# Patient Record
Sex: MALE | Race: WHITE
[De-identification: names, ages, dates, MRNs, and addresses within clinical notes are randomized per-mention and may not be internally consistent; named-entity substitution may affect disease eponyms.]

---

## 2018-03-08 ENCOUNTER — HOSPITAL ENCOUNTER (EMERGENCY)
Dept: HOSPITAL 17 - NEPD | Age: 38
Discharge: HOME | End: 2018-03-08
Payer: COMMERCIAL

## 2018-03-08 VITALS — HEIGHT: 71 IN | BODY MASS INDEX: 27.78 KG/M2 | WEIGHT: 198.42 LBS

## 2018-03-08 VITALS — TEMPERATURE: 97.9 F | SYSTOLIC BLOOD PRESSURE: 124 MMHG | DIASTOLIC BLOOD PRESSURE: 81 MMHG

## 2018-03-08 VITALS
OXYGEN SATURATION: 98 % | RESPIRATION RATE: 16 BRPM | TEMPERATURE: 98.9 F | SYSTOLIC BLOOD PRESSURE: 133 MMHG | DIASTOLIC BLOOD PRESSURE: 63 MMHG | HEART RATE: 82 BPM

## 2018-03-08 DIAGNOSIS — F41.9: ICD-10-CM

## 2018-03-08 DIAGNOSIS — F20.0: Primary | ICD-10-CM

## 2018-03-08 DIAGNOSIS — F17.200: ICD-10-CM

## 2018-03-08 PROCEDURE — 99283 EMERGENCY DEPT VISIT LOW MDM: CPT

## 2018-03-08 NOTE — PD
HPI


Chief Complaint:  Psychiatric Symptoms


Time Seen by Provider:  17:35


Travel History


International Travel<30 days:  No


Contact w/Intl Traveler<30days:  No


Traveled to known affect area:  No





History of Present Illness


HPI


37-year-old male here with reports of paranoia, requesting psychiatric 

evaluation.  Patient denies suicidal or homicidal ideation.  Patient states he 

has had a lot of stress in his life in the last couple of months regarding his 

rental situation next to his dad.  He states he has been more paranoid than 

normal, and he was concerned about this.  Patient states he got an injection of 

his [place] today, and since being here in the emergency department he actually 

has felt improved.  Patient also has Vistaril that he takes for anxiety which 

he has in his car.  Patient spoke with me for several minutes, and then 

requested to see if he was able to leave.  He denies any medical issues 

currently. He has no pain, fever, chills, or other symptoms.  Patient has no 

known drug allergies.





PFSH


Past Medical History


Anxiety:  Yes


Diabetes:  No


Diminished Hearing:  No


Psychiatric:  Yes (Paranoid Schizophrenia)


Schizophrenia:  Yes (PARANOID)





Social History


Alcohol Use:  No (RARE)


Tobacco Use:  Yes (1 PPD)


Substance Use:  No





Allergies-Medications


(Allergen,Severity, Reaction):  


Coded Allergies:  


     No Known Allergies (Verified  Adverse Reaction, Unknown, 3/8/18)


Reported Meds & Prescriptions





Reported Meds & Active Scripts


Active








Review of Systems


Except as stated in HPI:  all other systems reviewed are Neg


General / Constitutional:  No: Fever


Eyes:  No: Visual changes


HENT:  No: Headaches


Cardiovascular:  No: Chest Pain or Discomfort


Respiratory:  No: Shortness of Breath


Gastrointestinal:  No: Abdominal Pain


Genitourinary:  No: Dysuria


Musculoskeletal:  No: Pain


Skin:  No Rash


Neurologic:  No: Weakness


Psychiatric:  No: Depression


Endocrine:  No: Polydipsia


Hematologic/Lymphatic:  No: Easy Bruising





Physical Exam


Narrative


GENERAL: Patient is in no acute distress.


SKIN: Warm and dry.  Normal color.  Normal turgor.


HEAD: Atraumatic. Normocephalic. 


EYES: Pupils equal and round. No scleral icterus. No injection or drainage. 


ENT: No nasal bleeding or discharge.  Mucous membranes pink and moist.  Pharynx 

is clear.  Airways patent.


NECK: Trachea midline.  Supple and nontender.


CARDIOVASCULAR: Regular rate and rhythm.  


RESPIRATORY: No accessory muscle use. Clear to auscultation. Breath sounds 

equal bilaterally. 


MUSCULOSKELETAL: Extremities without clubbing, cyanosis, or edema. No obvious 

deformities. 


NEUROLOGICAL: Awake and alert. No obvious cranial nerve deficits.  Motor 

grossly within normal limits. Five out of 5 muscle strength in the arms and 

legs.  Normal speech.


PSYCHIATRIC: Appropriate mood and affect; insight and judgment normal.  Patient 

is not suicidal or homicidal.  Patient contracts for safety.





Data


Data


Last Documented VS





Vital Signs








  Date Time  Temp Pulse Resp B/P (MAP) Pulse Ox O2 Delivery O2 Flow Rate FiO2


 


3/8/18 16:10 98.9 82 16 133/63 (86) 98   








Orders





 Orders


Complete Blood Count With Diff (3/8/18 17:35)


Comprehensive Metabolic Panel (3/8/18 17:35)


Thyroid Stimulating Hormone (3/8/18 17:35)


Drug Screen, Random Urine (3/8/18 17:35)


Alcohol (Ethanol) (3/8/18 17:35)








MDM


Medical Decision Making


Medical Screen Exam Complete:  Yes


Emergency Medical Condition:  Yes


Medical Record Reviewed:  Yes


Differential Diagnosis


Paranoia.  History of schizophrenia.  Anxiety.


Narrative Course


Patient is evaluated and medically stable.


I have a discussion with the patient stating that he is welcome to stay and be 

medically cleared for psychiatric evaluation, but he is able to leave at any 

time if he chooses to do so.


I do not feel this patient is a hazard to himself or others.


I have no reason to Baker act this patient.


After me seeing the patient and discussing the alternatives with him, the 

patient chooses to leave to go home, and follow-up with his counselor tomorrow.


I feel this is safe for the patient to do at this time.


The patient is instructed to return if symptoms worsen as needed.


He contracts for safety and agrees with this plan.





Diagnosis





 Primary Impression:  


 Schizophrenia


 Qualified Codes:  F20.0 - Paranoid schizophrenia


Patient Instructions:  General Instructions





***Additional Instructions:  


Patient is evaluated and medically stable.


I have a discussion with the patient stating that he is welcome to stay and be 

medically cleared for psychiatric evaluation, but he is able to leave at any 

time if he chooses to do so.


I do not feel this patient is a hazard to himself or others.


I have no reason to Baker act this patient.


After me seeing the patient and discussing the alternatives with him, the 

patient chooses to leave to go home, and follow-up with his counselor tomorrow.


I feel this is safe for the patient to do at this time.


The patient is instructed to return if symptoms worsen as needed.


He contracts for safety and agrees with this plan.


***Med/Other Pt SpecificInfo:  No Change to Meds


Disposition:  01 DISCHARGE HOME


Condition:  Stable











Matt Godinez Mar 8, 2018 17:36

## 2018-04-01 ENCOUNTER — HOSPITAL ENCOUNTER (EMERGENCY)
Dept: HOSPITAL 17 - NEPD | Age: 38
LOS: 1 days | Discharge: HOME | End: 2018-04-02
Payer: MEDICAID

## 2018-04-01 VITALS — HEIGHT: 71 IN | WEIGHT: 194.01 LBS | BODY MASS INDEX: 27.16 KG/M2

## 2018-04-01 VITALS
TEMPERATURE: 98.5 F | OXYGEN SATURATION: 99 % | DIASTOLIC BLOOD PRESSURE: 63 MMHG | RESPIRATION RATE: 18 BRPM | HEART RATE: 69 BPM | SYSTOLIC BLOOD PRESSURE: 131 MMHG

## 2018-04-01 DIAGNOSIS — F17.200: ICD-10-CM

## 2018-04-01 DIAGNOSIS — F20.0: ICD-10-CM

## 2018-04-01 DIAGNOSIS — F41.9: Primary | ICD-10-CM

## 2018-04-01 PROCEDURE — 99283 EMERGENCY DEPT VISIT LOW MDM: CPT
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
## 2018-04-02 NOTE — PD
Physical Exam


Date Seen by Provider:  Apr 2, 2018


Time Seen by Provider:  02:42


Narrative


For full history and physical examination please see previous providers note.





Data


Data


Last Documented VS





Vital Signs








  Date Time  Temp Pulse Resp B/P (MAP) Pulse Ox O2 Delivery O2 Flow Rate FiO2


 


4/1/18 22:01 98.5 69 18 131/63 (85) 99   








Orders





 Orders


Psych Screen (4/1/18 23:23)








[facility]


Medical Record Reviewed:  Yes


Supervised Visit with WHIT:  No


Narrative Course


Patient presented voluntarily for psychiatric evaluation.  Patient was seen and 

evaluated by the psychiatric nurse screener, patient has had no suicidal 

homicidal ideations.  For full H&P please see previous providers note.  Patient 

is of no harm to himself or anyone else, he is compliant with psychiatric 

medications and counseling.  He lives within close proximity to family members 

and feels safe at home per his report to me.  Patient will be discharged home, 

is encouraged to return to emergency department immediately for any new or 

worsening symptoms.  He was advised to continue to follow-up with his 

psychiatrist as scheduled.  He verbalized understanding of these instructions.  

Patient stable for discharge.


Diagnosis





 Primary Impression:  


 Anxiety


 Additional Impression:  


 Schizophrenia


 Qualified Codes:  F20.9 - Schizophrenia, unspecified


Referrals:  


Psychiatrist


Patient Instructions:  General Instructions





***Additional Instruction:  


Return to emergency department immediately for any new or worsening symptoms


Continue home medications as previously prescribed


Follow-up with your primary doctor


Follow-up with your psychiatrist as scheduled


***Med/Other Pt SpecificInfo:  No Change to Meds


Disposition:  01 DISCHARGE HOME


Condition:  Stable











Karyn Chance Apr 2, 2018 02:45

## 2018-04-06 ENCOUNTER — HOSPITAL ENCOUNTER (INPATIENT)
Dept: HOSPITAL 17 - NEPJ | Age: 38
LOS: 4 days | Discharge: HOME | DRG: 885 | End: 2018-04-10
Attending: PSYCHIATRY & NEUROLOGY | Admitting: PSYCHIATRY & NEUROLOGY
Payer: COMMERCIAL

## 2018-04-06 VITALS
HEART RATE: 83 BPM | RESPIRATION RATE: 18 BRPM | SYSTOLIC BLOOD PRESSURE: 121 MMHG | OXYGEN SATURATION: 97 % | DIASTOLIC BLOOD PRESSURE: 70 MMHG | TEMPERATURE: 99.3 F

## 2018-04-06 VITALS
HEART RATE: 93 BPM | SYSTOLIC BLOOD PRESSURE: 130 MMHG | RESPIRATION RATE: 17 BRPM | DIASTOLIC BLOOD PRESSURE: 66 MMHG | TEMPERATURE: 98.6 F | OXYGEN SATURATION: 98 %

## 2018-04-06 VITALS — BODY MASS INDEX: 25.93 KG/M2 | HEIGHT: 71 IN | WEIGHT: 185.19 LBS

## 2018-04-06 DIAGNOSIS — F20.0: Primary | ICD-10-CM

## 2018-04-06 DIAGNOSIS — Z72.0: ICD-10-CM

## 2018-04-06 LAB
ALBUMIN SERPL-MCNC: 3.8 GM/DL (ref 3.4–5)
ALP SERPL-CCNC: 70 U/L (ref 45–117)
ALT SERPL-CCNC: 18 U/L (ref 12–78)
APAP SERPL-MCNC: (no result) MCG/ML (ref 10–30)
AST SERPL-CCNC: 14 U/L (ref 15–37)
BASOPHILS # BLD AUTO: 0.1 TH/MM3 (ref 0–0.2)
BASOPHILS NFR BLD: 1 % (ref 0–2)
BILIRUB SERPL-MCNC: 0.3 MG/DL (ref 0.2–1)
BUN SERPL-MCNC: 8 MG/DL (ref 7–18)
CALCIUM SERPL-MCNC: 8.5 MG/DL (ref 8.5–10.1)
CHLORIDE SERPL-SCNC: 106 MEQ/L (ref 98–107)
CREAT SERPL-MCNC: 0.94 MG/DL (ref 0.6–1.3)
EOSINOPHIL # BLD: 0.4 TH/MM3 (ref 0–0.4)
EOSINOPHIL NFR BLD: 3.5 % (ref 0–4)
ERYTHROCYTE [DISTWIDTH] IN BLOOD BY AUTOMATED COUNT: 12.7 % (ref 11.6–17.2)
GFR SERPLBLD BASED ON 1.73 SQ M-ARVRAT: 91 ML/MIN (ref 89–?)
GLUCOSE SERPL-MCNC: 86 MG/DL (ref 74–106)
HCO3 BLD-SCNC: 25.5 MEQ/L (ref 21–32)
HCT VFR BLD CALC: 42.2 % (ref 39–51)
HGB BLD-MCNC: 14.6 GM/DL (ref 13–17)
LYMPHOCYTES # BLD AUTO: 2.2 TH/MM3 (ref 1–4.8)
LYMPHOCYTES NFR BLD AUTO: 20.2 % (ref 9–44)
MCH RBC QN AUTO: 32 PG (ref 27–34)
MCHC RBC AUTO-ENTMCNC: 34.5 % (ref 32–36)
MCV RBC AUTO: 92.5 FL (ref 80–100)
MONOCYTE #: 0.8 TH/MM3 (ref 0–0.9)
MONOCYTES NFR BLD: 7.1 % (ref 0–8)
NEUTROPHILS # BLD AUTO: 7.3 TH/MM3 (ref 1.8–7.7)
NEUTROPHILS NFR BLD AUTO: 68.2 % (ref 16–70)
PLATELET # BLD: 190 TH/MM3 (ref 150–450)
PMV BLD AUTO: 9.7 FL (ref 7–11)
PROT SERPL-MCNC: 6.8 GM/DL (ref 6.4–8.2)
RBC # BLD AUTO: 4.56 MIL/MM3 (ref 4.5–5.9)
SODIUM SERPL-SCNC: 138 MEQ/L (ref 136–145)
WBC # BLD AUTO: 10.7 TH/MM3 (ref 4–11)

## 2018-04-06 PROCEDURE — 80307 DRUG TEST PRSMV CHEM ANLYZR: CPT

## 2018-04-06 PROCEDURE — 80048 BASIC METABOLIC PNL TOTAL CA: CPT

## 2018-04-06 PROCEDURE — 93005 ELECTROCARDIOGRAM TRACING: CPT

## 2018-04-06 PROCEDURE — 80053 COMPREHEN METABOLIC PANEL: CPT

## 2018-04-06 PROCEDURE — 80061 LIPID PANEL: CPT

## 2018-04-06 PROCEDURE — 83036 HEMOGLOBIN GLYCOSYLATED A1C: CPT

## 2018-04-06 PROCEDURE — 80164 ASSAY DIPROPYLACETIC ACD TOT: CPT

## 2018-04-06 PROCEDURE — 85025 COMPLETE CBC W/AUTO DIFF WBC: CPT

## 2018-04-06 PROCEDURE — 84443 ASSAY THYROID STIM HORMONE: CPT

## 2018-04-06 PROCEDURE — 99285 EMERGENCY DEPT VISIT HI MDM: CPT

## 2018-04-06 NOTE — PD
HPI


Chief Complaint:  Psychiatric Symptoms


Time Seen by Provider:  17:24


Travel History


International Travel<30 days:  No


Contact w/Intl Traveler<30days:  No


Traveled to known affect area:  No





History of Present Illness


HPI


37-year-old male that presents to the ED for evaluation of EXPARTE.  Patient 

has a history of schizophrenia and was brought here as patient apparently has 

been very delusional and noncompliant.  Patient was actually seen here recently 

voluntarily for evaluation of this and was discharged.  Per expert today he 

continues to be delusional and somewhat aggressive.  Per patient himself he has 

been compliant with his medications and yesterday got his shot of medication.  

Per patient he follows with his psychiatrist.  He has no other medical issues.  

Denies seeing things during other.  Denies any suicidal or homicidal ideation.  

He was brought here by police for evaluation of the EXPARTE.  Unclear as to the 

length of symptoms that this started.  Denies any drugs but states drinking 

alcohol occasionally.





PFSH


Past Medical History


Anxiety:  Yes


Diabetes:  No


Diminished Hearing:  No


Psychiatric:  Yes (Paranoid Schizophrenia)


Schizophrenia:  Yes (PARANOID)





Social History


Alcohol Use:  Yes (RARE)


Tobacco Use:  Yes (1 PPD)


Substance Use:  No (pt denies)





Allergies-Medications


(Allergen,Severity, Reaction):  


Coded Allergies:  


     No Known Allergies (Verified  Adverse Reaction, Unknown, 4/6/18)


Reported Meds & Prescriptions





Reported Meds & Active Scripts


Active


Reported


Vistaril (Hydroxyzine Pamoate) 50 Mg Cap 50 Mg PO BID








Review of Systems


Except as stated in HPI:  all other systems reviewed are Neg





Physical Exam


Narrative


GENERAL: 


SKIN: Warm and dry.


HEAD: Atraumatic. Normocephalic. 


EYES: Pupils equal and round. No scleral icterus. No injection or drainage. 


ENT: No nasal bleeding or discharge.  Mucous membranes pink and moist.  Tongue 

is midline.  No uvula deviation.


NECK: Trachea midline. No JVD. 


CARDIOVASCULAR: Regular rate and rhythm.  


RESPIRATORY: No accessory muscle use. Clear to auscultation. Breath sounds 

equal bilaterally. 


GASTROINTESTINAL: Abdomen soft, non-tender, nondistended. Hepatic and splenic 

margins not palpable. 


MUSCULOSKELETAL: Extremities without clubbing, cyanosis, or edema. No obvious 

deformities.  Full range of motion of the upper and lower extremities 

bilaterally.  2+ pulses bilaterally.


NEUROLOGICAL: Awake and alert. No obvious cranial nerve deficits.  Motor 

grossly within normal limits. Five out of 5 muscle strength in the arms and 

legs.  Normal speech.


PSYCHIATRIC: Appropriate mood and affect; insight and judgment normal.





Data


Data


Last Documented VS





Vital Signs








  Date Time  Temp Pulse Resp B/P (MAP) Pulse Ox O2 Delivery O2 Flow Rate FiO2


 


4/6/18 17:19 98.6 93 17 130/66 (87) 98   








Orders





 Orders


Complete Blood Count With Diff (4/6/18 17:18)


Comprehensive Metabolic Panel (4/6/18 17:18)


Thyroid Stimulating Hormone (4/6/18 17:18)


Psych Screen (4/6/18 17:18)


Drug Screen, Random Urine (4/6/18 17:18)


Alcohol (Ethanol) (4/6/18 17:18)


Salicylates (Aspirin) (4/6/18 17:18)


Tylenol (Acetaminophen) (4/6/18 17:18)





Labs





Laboratory Tests








Test


  4/6/18


17:50


 


White Blood Count 10.7 TH/MM3 


 


Red Blood Count 4.56 MIL/MM3 


 


Hemoglobin 14.6 GM/DL 


 


Hematocrit 42.2 % 


 


Mean Corpuscular Volume 92.5 FL 


 


Mean Corpuscular Hemoglobin 32.0 PG 


 


Mean Corpuscular Hemoglobin


Concent 34.5 % 


 


 


Red Cell Distribution Width 12.7 % 


 


Platelet Count 190 TH/MM3 


 


Mean Platelet Volume 9.7 FL 


 


Neutrophils (%) (Auto) 68.2 % 


 


Lymphocytes (%) (Auto) 20.2 % 


 


Monocytes (%) (Auto) 7.1 % 


 


Eosinophils (%) (Auto) 3.5 % 


 


Basophils (%) (Auto) 1.0 % 


 


Neutrophils # (Auto) 7.3 TH/MM3 


 


Lymphocytes # (Auto) 2.2 TH/MM3 


 


Monocytes # (Auto) 0.8 TH/MM3 


 


Eosinophils # (Auto) 0.4 TH/MM3 


 


Basophils # (Auto) 0.1 TH/MM3 


 


CBC Comment DIFF FINAL 


 


Differential Comment  











MDM


Medical Decision Making


Medical Screen Exam Complete:  Yes


Emergency Medical Condition:  Yes


Medical Record Reviewed:  Yes


Interpretation(s)


CBC & BMP Diagram


4/6/18 17:50








Differential Diagnosis


Depression versus suicidal ideation versus anxiety versus adjustment disorder 

versus mood disorder versus bipolar disorder versus schizophrenia versus 

paranoid disorder versus psychosis versus substance abuse versus alcohol abuse 

versus alcohol induced psychosis versus homicidality addition versus cutting 

versus personality disorder


Narrative Course


37-year-old male that presents to the ED for evaluation of psych.  Patient was 

properly examined and was found to have signs and symptoms consistent with what 

appears to be psychiatric illness.  Labs were drawn.  Patient was medically 

clear.  Okay to be seen by psych.


Mental health screening was discussed with the patient.





Diagnosis





 Primary Impression:  


 Schizophrenia


 Qualified Codes:  F20.9 - Schizophrenia, unspecified











Cristobal Gamez Apr 6, 2018 18:17

## 2018-04-07 VITALS
TEMPERATURE: 99.2 F | RESPIRATION RATE: 18 BRPM | SYSTOLIC BLOOD PRESSURE: 114 MMHG | HEART RATE: 77 BPM | DIASTOLIC BLOOD PRESSURE: 71 MMHG | OXYGEN SATURATION: 99 %

## 2018-04-07 VITALS
DIASTOLIC BLOOD PRESSURE: 73 MMHG | RESPIRATION RATE: 18 BRPM | OXYGEN SATURATION: 96 % | SYSTOLIC BLOOD PRESSURE: 114 MMHG | TEMPERATURE: 98 F | HEART RATE: 90 BPM

## 2018-04-07 VITALS
OXYGEN SATURATION: 96 % | TEMPERATURE: 97.4 F | HEART RATE: 87 BPM | RESPIRATION RATE: 19 BRPM | SYSTOLIC BLOOD PRESSURE: 126 MMHG | DIASTOLIC BLOOD PRESSURE: 76 MMHG

## 2018-04-07 RX ADMIN — NICOTINE SCH PATCH: 21 PATCH, EXTENDED RELEASE TOPICAL at 15:23

## 2018-04-07 RX ADMIN — DIVALPROEX SODIUM SCH MG: 500 TABLET, DELAYED RELEASE ORAL at 15:21

## 2018-04-07 RX ADMIN — DIVALPROEX SODIUM SCH MG: 500 TABLET, DELAYED RELEASE ORAL at 21:28

## 2018-04-07 NOTE — HHI.HP
Provisional Diagnosis


Admission Date





[place].


Schizophrenia





                               Certification of Person's Competence 


                           To Provide Express and Informed Consent





I have personally examined Lior Francis , a person being served at 

[facility] on, Apr 7, 2018 14:06.


Express and informed consent means consent voluntarily given in writing, by a 

competent person, after sufficient explanation and disclosure of the subject 

matter involved to enable the person to make a knowing and willful decision 

without any element of force, fraud, deceit, duress, or other form of 

constraint or coercion.





This person is 18 years of age or older, is not now known to be incompetent to 

consent to treatment with a guardian advocate, and does not have a health care 

surrogate or proxy currently making medical treatment decisions.  I have found 

this person to be one of the following:





[] Competent to provide express and informed consent, as defined above, for 

voluntary admission to this facility and is competent to provide express and 

informed consent for treatment.  He/she has the consistent capacity to make 

well reasoned, willful, and knowing decisions concerning his or her medical or 

mental health treatment.  The person fully and consistently understands the 

purpose of the admission for examination/placement and is fully capable of 

personally exercising all rights [place]





[xxx] Incompetent to provide express and informed consent to voluntary admission

, and this is incompetent to provide express and informed consent to treatment.

  The person must be transferred to involuntary status and a petition for a 

guardian advocate filed with the Circuit Court.





[] Refusing to provide express and informed consent to voluntary admission but 

is competent to provide express and informed consent for treatment.  The person 

must be discharged or transferred to involuntary status.





Form shall be completed within 24 hours of a person's arrival at the receiving 

facility and filed in the clinical record of each person:


1. Admitted on a voluntary basis


2. Permitted to provide express and informed consent to his/her own treatment


3. Allowed to transfer from involuntary to voluntary status


4. Prior to permitting a person to consent to his or her own treatment after 

having been previously found incompetent to consent to treatment.





History of Present Illness


Capacity:  Has Capacity


HPI


Patient is a 36 y/o  man, single, no children, lives alone, on SSI, 

has part time employ, psychiatric history of schizophrenia, multiple 

psychiatric admissions, no prior suicide attempt or self-injurious behavior, 

history of state hospitalizations twice, remote history of substance use, who 

was brought in under ex parte which reported patient with recent aggressive 

behavior toward father as well as displaying aggressive behavior, e.g. pushing 

refrigerator out of second story window, was brought in for evaluation and 

admitted to the inpatient psychiatry for further evaluation and management.  

Patient was found sitting in hospital bed noted B, cooperative.  Patient states 

that he did not understand why he was brought to the hospital he states has 

been compliant with outpatient treatment which she reports having received 

Invega Sustenna yesterday (dose unspecified) and being followed by [facility].  Patient mentions that the report of aggressive behavior was "months 

ago" and denies having any recent episodes of agitation or aggression.  Patient 

states that he just wants to be happy and live his life without any difficulty.

  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 

own transportation and recently had come to the ER requesting assistance with 

placement as he mentions is considering living elsewhere as keep states his 

family does not understand what he goes through.  Patient denies any depressive

, manic or psychotic symptoms at this time.  Patient reports having had 

previous auditory hallucinations years ago when he was using ecstasy but denies 

any recent perceptual disturbances, no delusional material elicited during 

interview.  Patient this time denies any SI, HI, AVH or delusions.


Family psychiatric history: Denies


Past psychiatric history: Previous psychiatric diagnoses schizophrenia, 

multiple psychiatric admissions, no prior suicide attempt or self-injurious 

behavior, reports having been involved with [facility], and state 

hospitalizations on 2 occasions.  Patient has outpatient provider at [facility] and reports having been receiving several months of Invega Sustenna 

and plan to start Invega Trinza, also Vistaril 50 mg p.o. twice daily.  Denies 

history of abuse.


Substance use history: Tobacco one half packs per day, alcohol use "sometimes", 

2-3 times per week usually a couple of beers at a time, denies any recent drug 

use but did report remote history of drug use as a teenager.  Denies any 

previous rehabilitation programs for substance use.


Past medical history: Denies


Allergies: NKDA


Social history: Single, no children, domiciled alone, but father lives next door

, unemployed on SSI but also states having a part-time job, highest education 

is ninth grade.  Collateral contacts include father, Norman Francis and sister 

Niurka Francis - [phone].





Review of Systems


Respiratory:  COMPLAINS OF: Shortness of breath





Past Psych History


Psychological trauma history


Denies


Violence risk - others (6 mos)


Elevated due to recent report of aggressive behavior toward others


Violence risk - self (6 mos)


Low





Substance Abuse History


Drugs/Alcohol past 12 months


Tobacco one half packs per day, alcohol use "sometimes", 2-3 times per week 

usually a couple of beers at a time, denies any recent drug use but did report 

remote history of drug use as a teenager.  Denies any previous rehabilitation 

programs for substance use.





Past Family Social History


Coded Allergies:  


     No Known Allergies (Verified  Adverse Reaction, Unknown, 4/6/18)


Reported Medications


Paliperidone Palmitate Inj (Invega Sustenna Inj) 156 Mg/Ml Inj, 156 MG IM Q28D 

for Schizophrenia, #1 VIAL 0 Refills


   4/7/18


Hydroxyzine Pamoate (Vistaril) 50 Mg Cap, 50 MG PO BID, CAP 0 Refills


   3/8/18





Current Medications








 Medications


  (Trade)  Dose


 Ordered  Sig/Carolee


 Route  Start Time


 Stop Time Status Last Admin


 


  (Vistaril)  50 mg  BID


 PO  4/7/18 14:15


   UNV  


 


 


  (Tylenol)  650 mg  Q4H  PRN


 PO  4/7/18 14:15


   UNV  


 


 


  (Milk Of


 Magnesia Liq)  30 ml  DAILY  PRN


 PO  4/7/18 14:15


   UNV  


 


 


  (Mag-Al Plus


 Susp Liq)  30 ml  Q6H  PRN


 PO  4/7/18 14:15


   UNV  


 


 


  (Habitrol 21 Mg


 Patch.24 Hr)  1 patch  DAILY


 T-DERMAL  4/7/18 14:15


   UNV  


 


 


  (Atarax)  50 mg  Q6H  PRN


 PO  4/7/18 14:15


   UNV  


 


 


 Miscellaneous


 Information  1  DAILY


 T-DERMAL  4/8/18 09:00


   UNV  


 








Family Psych History


Denies


Social History


Single, no children, domiciled alone, but father lives next door, unemployed on 

SSI but also states having a part-time job, highest education is ninth grade.  

Collateral contacts include father, Norman Francis and sister iNurka Francis - [phone].


Patient's Strengths (min. 2)


Verbal and communicative





Physical Exam


Patient not noted to be acute distress, noted to have multiple tattoos, no 

gross motor abnormalities, no psychomotor agitation or retardation, no tremor 

signs of EPS.


Vital Signs





Vital Signs








  Date Time  Temp Pulse Resp B/P (MAP) Pulse Ox O2 Delivery O2 Flow Rate FiO2


 


4/7/18 05:00 98.0 90 18 114/73 (87) 96 Room Air  








Lab Results











Test


  4/6/18


17:50 4/6/18


18:40


 


White Blood Count 10.7 TH/MM3  


 


Red Blood Count 4.56 MIL/MM3  


 


Hemoglobin 14.6 GM/DL  


 


Hematocrit 42.2 %  


 


Mean Corpuscular Volume 92.5 FL  


 


Mean Corpuscular Hemoglobin 32.0 PG  


 


Mean Corpuscular Hemoglobin


Concent 34.5 % 


  


 


 


Red Cell Distribution Width 12.7 %  


 


Platelet Count 190 TH/MM3  


 


Mean Platelet Volume 9.7 FL  


 


Neutrophils (%) (Auto) 68.2 %  


 


Lymphocytes (%) (Auto) 20.2 %  


 


Monocytes (%) (Auto) 7.1 %  


 


Eosinophils (%) (Auto) 3.5 %  


 


Basophils (%) (Auto) 1.0 %  


 


Neutrophils # (Auto) 7.3 TH/MM3  


 


Lymphocytes # (Auto) 2.2 TH/MM3  


 


Monocytes # (Auto) 0.8 TH/MM3  


 


Eosinophils # (Auto) 0.4 TH/MM3  


 


Basophils # (Auto) 0.1 TH/MM3  


 


CBC Comment DIFF FINAL  


 


Differential Comment   


 


Blood Urea Nitrogen 8 MG/DL  


 


Creatinine 0.94 MG/DL  


 


Random Glucose 86 MG/DL  


 


Total Protein 6.8 GM/DL  


 


Albumin 3.8 GM/DL  


 


Calcium Level 8.5 MG/DL  


 


Alkaline Phosphatase 70 U/L  


 


Aspartate Amino Transf


(AST/SGOT) 14 U/L 


  


 


 


Alanine Aminotransferase


(ALT/SGPT) 18 U/L 


  


 


 


Total Bilirubin 0.3 MG/DL  


 


Sodium Level 138 MEQ/L  


 


Potassium Level 3.7 MEQ/L  


 


Chloride Level 106 MEQ/L  


 


Carbon Dioxide Level 25.5 MEQ/L  


 


Anion Gap 7 MEQ/L  


 


Estimat Glomerular Filtration


Rate 91 ML/MIN 


  


 


 


Thyroid Stimulating Hormone


3rd Gen 0.750 uIU/ML 


  


 


 


Salicylates Level 3.1 MG/DL  


 


Acetaminophen Level


  LESS THAN 2.0


MCG/ML 


 


 


Ethyl Alcohol Level


  LESS THAN 3


MG/DL 


 


 


Urine Opiates Screen  NEG 


 


Urine Barbiturates Screen  NEG 


 


Urine Amphetamines Screen  NEG 


 


Urine Benzodiazepines Screen  NEG 


 


Urine Cocaine Screen  NEG 


 


Urine Cannabinoids Screen  NEG 











Mental Status Examination


Appearance:  Appropriate


Consciousness:  Alert


Orientation:  Person, Place, Date/Time


Motor Activity:  Normal gait


Speech:  Unremarkable


Language:  Adequate


Fund of Knowledge:  Inadequate


Attention and Concentration:  Adequate


Memory:  Unremarkable


Mood:  Appropriate


Affect:  Blunt


Thought Process & Associations:  Intact, Logical, Linear


Thought Content:  Appropriate


Hallucination Type:  None


Delusion Type:  None


Suicidal Ideation:  No


Suicidal Plan:  No


Suicidal Intention:  No


Homicidal Ideation:  No


Homicidal Plan:  No


Homicidal Intention:  No


Insight:  Fair


Judgment:  Impulsive





Assessment & Plan


Problem List:  


(1) Schizophrenia


ICD Codes:  F20.9 - Schizophrenia


Status:  Acute


Assessment & Plan


Estimated LOS: 5-7 days.  Patient is a 37-year-old  man who carries a 

diagnosis schizophrenia, multiple psychiatric admissions, remote use of 

substance use as a teenager, brought in under ex parte due to report of recent 

aggressive behavior toward others which patient was admitted to the inpatient 

psychiatry unit for further evaluation and management.  We will start patient 

on valproic acid 500 mg p.o. twice daily for mood stabilization, hydroxyzine 50 

mg p.o. twice daily, will obtain verification of recent Invega Sustenna SCALES 

which she reports I received yesterday through [facility].  Collateral 

formation pending from family.  Social work intervention for psychosocial 

assessment.  Continue to monitor with behavior.  Discharge planning in progress.


Discharge Planning


To be determined.











Alex Hussein MD Apr 7, 2018 14:19

## 2018-04-08 VITALS
SYSTOLIC BLOOD PRESSURE: 115 MMHG | RESPIRATION RATE: 17 BRPM | TEMPERATURE: 99 F | DIASTOLIC BLOOD PRESSURE: 60 MMHG | OXYGEN SATURATION: 97 % | HEART RATE: 55 BPM

## 2018-04-08 VITALS
SYSTOLIC BLOOD PRESSURE: 125 MMHG | OXYGEN SATURATION: 96 % | HEART RATE: 84 BPM | RESPIRATION RATE: 18 BRPM | TEMPERATURE: 98.2 F | DIASTOLIC BLOOD PRESSURE: 72 MMHG

## 2018-04-08 LAB
BUN SERPL-MCNC: 11 MG/DL (ref 7–18)
CALCIUM SERPL-MCNC: 8.9 MG/DL (ref 8.5–10.1)
CHLORIDE SERPL-SCNC: 104 MEQ/L (ref 98–107)
CHOLEST SERPL-MCNC: 132 MG/DL (ref 120–200)
CHOLESTEROL/ HDL RATIO: 2.96 RATIO
CREAT SERPL-MCNC: 1.04 MG/DL (ref 0.6–1.3)
GFR SERPLBLD BASED ON 1.73 SQ M-ARVRAT: 81 ML/MIN (ref 89–?)
GLUCOSE SERPL-MCNC: 110 MG/DL (ref 74–106)
HBA1C MFR BLD: 4.7 % (ref 4.3–6)
HCO3 BLD-SCNC: 29.4 MEQ/L (ref 21–32)
HDLC SERPL-MCNC: 44.5 MG/DL (ref 40–60)
LDLC SERPL-MCNC: 76 MG/DL (ref 0–99)
SODIUM SERPL-SCNC: 141 MEQ/L (ref 136–145)
TRIGL SERPL-MCNC: 60 MG/DL (ref 42–150)

## 2018-04-08 RX ADMIN — NICOTINE SCH PATCH: 21 PATCH, EXTENDED RELEASE TOPICAL at 09:00

## 2018-04-08 RX ADMIN — DIVALPROEX SODIUM SCH MG: 500 TABLET, DELAYED RELEASE ORAL at 21:56

## 2018-04-08 RX ADMIN — DIVALPROEX SODIUM SCH MG: 500 TABLET, DELAYED RELEASE ORAL at 09:07

## 2018-04-08 NOTE — EKG
Date Performed: 04/08/2018       Time Performed: 12:32:53

 

PTAGE:      37 years

 

EKG:      Sinus rhythm 

 

 NORMAL ECG 

 

NO PREVIOUS TRACING            

 

DOCTOR:   Aaron Wu  Interpretating Date/Time  04/08/2018 20:03:13

## 2018-04-08 NOTE — HHI.PYPN
Subjective


Remarks


Reviewed electronic medical record and discussed case with staff.  Staff 

reports that patient has been pleasant and had a good night.  He has adapted 

well to the unit.  Follow-up was performed in the hallway with nurse present.  

Patient reports that he slept well and his appetite has been good.  He denies 

having thoughts of self-harm, homicidal ideation, visual or auditory 

hallucinations.  He does appear to be paranoid stating that "they are harassing 

me" when asked to elaborate on who they are he states "I do not know who they 

are".  He believes he is here due to an incident that occurred approximately a 

month ago.  He expressed a desire to go home.  His interactions with this 

provider were appropriate throughout the evaluation.





Mental Status Examination


Appearance:  Appropriate


Consciousness:  Alert


Orientation:  Person, Place, Date/Time


Motor Activity:  Normal gait


Speech:  Unremarkable


Language:  Adequate


Fund of Knowledge:  Inadequate


Attention and Concentration:  Adequate


Memory:  Unremarkable


Mood:  Appropriate


Affect:  Blunt


Thought Process & Associations:  Intact, Logical, Linear


Thought Content:  Appropriate


Hallucination Type:  None


Delusion Type:  None


Suicidal Ideation:  No


Suicidal Plan:  No


Suicidal Intention:  No


Homicidal Ideation:  No


Homicidal Plan:  No


Homicidal Intention:  No


Insight:  Fair


Judgment:  Impulsive





Results


Labs











Test


  4/8/18


08:09








Vitals/IOs





Vital Signs








  Date Time  Temp Pulse Resp B/P (MAP) Pulse Ox O2 Delivery O2 Flow Rate FiO2


 


4/8/18 06:37 99.0 55 17 115/60 (78) 97   


 


4/7/18 05:00      Room Air  











Assessment & Plan


Problem List:  


(1) Schizophrenia


ICD Codes:  F20.9 - Schizophrenia


Status:  Acute


Assessment & Plan


Estimated LOS: Patient remains paranoid and still claims the incident happened 

a month ago.  He requires psychiatric stabilization.


Justification for Cont. Inpt.


Moving this patient to a lower level of care would likely result in a 

decompensation at this point.  Patient is apparently a danger to others.











Ngoc Tan Apr 8, 2018 08:29

## 2018-04-09 VITALS
RESPIRATION RATE: 18 BRPM | OXYGEN SATURATION: 100 % | HEART RATE: 70 BPM | TEMPERATURE: 98.1 F | DIASTOLIC BLOOD PRESSURE: 61 MMHG | SYSTOLIC BLOOD PRESSURE: 121 MMHG

## 2018-04-09 VITALS
SYSTOLIC BLOOD PRESSURE: 117 MMHG | RESPIRATION RATE: 16 BRPM | TEMPERATURE: 98.2 F | DIASTOLIC BLOOD PRESSURE: 63 MMHG | OXYGEN SATURATION: 98 % | HEART RATE: 70 BPM

## 2018-04-09 RX ADMIN — DIVALPROEX SODIUM SCH MG: 500 TABLET, DELAYED RELEASE ORAL at 21:19

## 2018-04-09 RX ADMIN — NICOTINE SCH PATCH: 21 PATCH, EXTENDED RELEASE TOPICAL at 09:44

## 2018-04-09 RX ADMIN — DIVALPROEX SODIUM SCH MG: 500 TABLET, DELAYED RELEASE ORAL at 09:41

## 2018-04-09 NOTE — PD.PSY.CON
Provisional Diagnosis


Admission Date


Apr 7, 2018 at 14:04


[place] I.


Schizophrenia





History of Present Illness


Service


Psychiatry


Consult Requested By


Psychiatry


Reason for Consult


Second opinion


Primary Care Physician


Jamal Velazquez D.O.


HPI


Patient is a 36 y/o  man, single, no children, lives alone, on SSI, 

has part time employ, psychiatric history of schizophrenia, multiple 

psychiatric admissions, no prior suicide attempt or self-injurious behavior, 

history of state hospitalizations twice, remote history of substance use, who 

was brought in under ex parte which reported patient with recent aggressive 

behavior toward father as well as displaying aggressive behavior, e.g. pushing 

refrigerator out of second story window, was brought in for evaluation and 

admitted to the inpatient psychiatry for further evaluation and management.  

Patient was found sitting in hospital bed noted B, cooperative.  Patient states 

that he did not understand why he was brought to the hospital he states has 

been compliant with outpatient treatment which she reports having received 

Invega Sustenna yesterday (dose unspecified) and being followed by [facility].  Patient mentions that the report of aggressive behavior was "months 

ago" and denies having any recent episodes of agitation or aggression.  Patient 

states that he just wants to be happy and live his life without any difficulty.

  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 

own transportation and recently had come to the ER requesting assistance with 

placement as he mentions is considering living elsewhere as keep states his 

family does not understand what he goes through.  Patient denies any depressive

, manic or psychotic symptoms at this time.  Patient reports having had 

previous auditory hallucinations years ago when he was using ecstasy but denies 

any recent perceptual disturbances, no delusional material elicited during 

interview.  Patient this time denies any SI, HI, AVH or delusions.





The patient is a 37 years old  man, single, domiciled, part-time 

employed, on SSI, with extensive psychiatric history of schizophrenia, 

psychiatric admissions, no previous suicidal attempts, history of self 

injurious behavior, who was brought to the hospital due to aggressive behavior.

  Consulted to me for second opinion.  Psychiatric evaluation today the patient 

is cooperative, calm, reports feeling better, denies suicidal and homicidal 

ideation, denies visual and auditory hallucinations.  No aggressive behavior, 

no agitation has been reported.  The patient is logical, coherent and relevant.

  Oriented 3.  Compliant on medications.





Review of Systems


Constitutional:  DENIES: Diaphoretic episodes, Fatigue, Fever, Weight gain, 

Weight loss, Chills, Dizziness, Change in appetite, Night Sweats


Endocrine:  DENIES: Heat/cold intolerance, Polydipsia, Polyuria, Polyphagia


Eyes:  DENIES: Blurred vision, Diplopia, Eye inflammation, Eye pain, Vision loss

, Photosensitivity, Double Vision


Ears, nose, mouth, throat:  DENIES: Tinnitus, Hearing loss, Vertigo, Nasal 

discharge, Oral lesions, Throat pain, Hoarseness, Ear Pain, Running Nose, 

Epistaxis, Sinus Pain, Toothache, Odynophagia


Respiratory:  DENIES: Apneas, Cough, Snoring, Wheezing, Hemoptysis, Sputum 

production, Shortness of breath


Cardiovascular:  DENIES: Chest pain, Palpitations, Syncope, Dyspnea on Exertion

, PND, Lower Extremity Edema, Orthopnea, Claudication


Gastrointestinal:  DENIES: Abdominal pain, Black stools, Bloody stools, 

Constipation, Diarrhea, Nausea, Vomiting, Difficulty Swallowing, Anorexia


Genitourinary:  DENIES: Sexual dysfunction, Urinary frequency, Urinary 

incontinence, Urgency, Hematuria, Dysuria, Nocturia, Penile Discharge, 

Testicular Pain, Testicular Swelling


Musculoskeletal:  DENIES: Joint pain, Muscle aches, Stiffness, Joint Swelling, 

Back pain, Neck pain


Integumentary:  DENIES: Abnormal pigmentation, Nail changes, Pruritus, Rash


Hematologic/lymphatic:  DENIES: Bruising, Lymphadenopathy


Immunologic/allergic:  DENIES: Eczema, Urticaria


Neurologic:  DENIES: Abnormal gait, Headache, Localized weakness, Paresthesias, 

Seizures, Speech Problems, Tremor, Poor Balance


Psychiatric:  DENIES: Anxiety, Confusion, Mood changes, Depression, 

Hallucinations, Agitation, Suicidal Ideation, Homicidal Ideation, Delusions





Past Family Social History


Coded Allergies:  


     No Known Allergies (Verified  Adverse Reaction, Unknown, 4/6/18)


Reported Medications


Paliperidone Palmitate Inj (Invega Sustenna Inj) 156 Mg/Ml Inj, 156 MG IM Q28D 

for Schizophrenia, #1 VIAL 0 Refills


   4/7/18


Hydroxyzine Pamoate (Vistaril) 50 Mg Cap, 50 MG PO BID, CAP 0 Refills


   3/8/18





Current Medications








 Medications


  (Trade)  Dose


 Ordered  Sig/Carolee


 Route  Start Time


 Stop Time Status Last Admin


 


  (Vistaril)  50 mg  BID


 PO  4/7/18 14:15


    4/9/18 09:41


 


 


  (Tylenol)  650 mg  Q4H  PRN


 PO  4/7/18 14:15


     


 


 


  (Milk Of


 Magnesia Liq)  30 ml  DAILY  PRN


 PO  4/7/18 14:15


     


 


 


  (Mag-Al Plus


 Susp Liq)  30 ml  Q6H  PRN


 PO  4/7/18 14:15


     


 


 


  (Habitrol 21 Mg


 Patch.24 Hr)  1 patch  DAILY


 T-DERMAL  4/7/18 14:15


    4/9/18 09:44


 


 


  (Atarax)  50 mg  Q6H  PRN


 PO  4/7/18 14:15


     


 


 


 Miscellaneous


 Information  1  DAILY


 T-DERMAL  4/8/18 09:00


    4/9/18 09:45


 


 


  (Depakote Dr)  500 mg  BID


 PO  4/7/18 14:15


    4/9/18 09:41


 








Patient's Strengths (min. 2)


Verbal and communicative





Physical Exam


Vital Signs





Vital Signs








  Date Time  Temp Pulse Resp B/P (MAP) Pulse Ox O2 Delivery O2 Flow Rate FiO2


 


4/9/18 06:15 98.2 70 16 117/63 (81) 98   


 


4/7/18 05:00      Room Air  














I/O   


 


 4/9/18 4/9/18 4/10/18





 08:00 16:00 00:00


 


Intake Total 240 ml  


 


Balance 240 ml  











Mental Status Examination


Appearance:  Appropriate


Consciousness:  Alert


Orientation:  Person, Place, Date/Time


Motor Activity:  Normal gait


Speech:  Unremarkable


Language:  Adequate


Fund of Knowledge:  Inadequate


Attention and Concentration:  Adequate


Memory:  Unremarkable


Mood:  Appropriate


Affect:  Blunt


Thought Process & Associations:  Intact, Logical, Linear


Thought Content:  Appropriate


Hallucination Type:  None


Delusion Type:  None


Suicidal Ideation:  No


Suicidal Plan:  No


Suicidal Intention:  No


Homicidal Ideation:  No


Homicidal Plan:  No


Homicidal Intention:  No


Insight:  Fair


Judgment:  Impulsive





Assessment & Plan


Problem List:  


(1) Schizophrenia


ICD Codes:  F20.9 - Schizophrenia


Status:  Acute


Assessment & Plan:  I have seen and examined this patient for second opinion.  

Review documentation.  I agree and concur with Dr. Barriga assessment and 

plan.





Assessment & Plan


Estimated LOS:  King Joe MD Apr 9, 2018 10:44

## 2018-04-09 NOTE — HHI.PYPN
Subjective


Remarks


Patient seen for follow-up, chart reviewed.  Discussion with nursing staff 

reported that the patient has been calm and cooperative, no behavioral 

disturbances.  Patient was found ambulating on the unit noted B, cooperative 

interview.  Patient states, portions with his being "alright" denies any 

suicidal homicidal ideations and states that prior to his admission he had not 

received his long-acting injectable on 04/05/18 and states he has been 

receiving this for the past couple of months with plan to transition over to 

Invega Melbaza.  He mentions wanting to continue outpatient care through [facility] and care coordinator as well as consideration of having them assist 

his financial management.  Patient denies any perceptual disturbances or 

delusions at this time.





Review of Systems


Except as stated in HPI:  all other systems reviewed are Neg





Mental Status Examination


Appearance:  Appropriate


Consciousness:  Alert


Orientation:  Person, Place, Date/Time


Motor Activity:  Normal gait


Speech:  Unremarkable


Language:  Adequate


Fund of Knowledge:  Inadequate


Attention and Concentration:  Adequate


Memory:  Unremarkable


Mood:  Appropriate


Affect:  Blunt


Thought Process & Associations:  Intact, Logical, Linear


Thought Content:  Appropriate


Hallucination Type:  None


Delusion Type:  None


Suicidal Ideation:  No


Suicidal Plan:  No


Suicidal Intention:  No


Homicidal Ideation:  No


Homicidal Plan:  No


Homicidal Intention:  No


Insight:  Fair


Judgment:  Impulsive





Results


Vitals/IOs





Vital Signs








  Date Time  Temp Pulse Resp B/P (MAP) Pulse Ox O2 Delivery O2 Flow Rate FiO2


 


4/9/18 16:40 98.1 70 18 121/61 (81) 100   


 


4/7/18 05:00      Room Air  














Intake and Output   


 


 4/9/18 4/9/18 4/10/18





 08:00 16:00 00:00


 


Intake Total 240 ml  


 


Balance 240 ml  











Assessment & Plan


Problem List:  


(1) Schizophrenia


ICD Codes:  F20.9 - Schizophrenia


Status:  Acute


Assessment & Plan


Patient this time with no behavioral services, has been compliant with treatment

, denying any SI, HI.  Patient to continue current treatment.  BPA level to be 

ordered for tomorrow morning.  Continue to monitor mood and behavior.  

Discharge planning in progress.


Justification for Cont. Inpt.


At risk for further decompensation at lower level of care.


Discharge Planning


Patient to return back to his residence was psychiatrically stable.











Alex Hussein MD Apr 9, 2018 16:50

## 2018-04-10 VITALS
TEMPERATURE: 98 F | SYSTOLIC BLOOD PRESSURE: 95 MMHG | OXYGEN SATURATION: 58 % | DIASTOLIC BLOOD PRESSURE: 53 MMHG | RESPIRATION RATE: 18 BRPM | HEART RATE: 58 BPM

## 2018-04-10 RX ADMIN — DIVALPROEX SODIUM SCH MG: 500 TABLET, DELAYED RELEASE ORAL at 08:45

## 2018-04-10 RX ADMIN — NICOTINE SCH PATCH: 21 PATCH, EXTENDED RELEASE TOPICAL at 08:48

## 2018-04-10 NOTE — HHI.DS
Psychiatry Discharge Summary


Inpatient Psychiatric care?:  Yes


Advance Directive:  No


Reason Not Provided:  Due to Patient Condition


Mental Health AdvanceDirective:  No


Health Care Proxy:  No


Admission


Admission Date


Apr 7, 2018 at 14:04


Admission Diagnosis:  


(1) Schizophrenia


ICD Code:  F20.9 - Schizophrenia


Brief History


Patient is a 36 y/o  man, single, no children, lives alone, on SSI, 

has part time employ, psychiatric history of schizophrenia, multiple 

psychiatric admissions, no prior suicide attempt or self-injurious behavior, 

history of state hospitalizations twice, remote history of substance use, who 

was brought in under ex parte which reported patient with recent aggressive 

behavior toward father as well as displaying aggressive behavior, e.g. pushing 

refrigerator out of second story window, was brought in for evaluation and 

admitted to the inpatient psychiatry for further evaluation and management.  

Patient was found sitting in hospital bed noted B, cooperative.  Patient states 

that he did not understand why he was brought to the hospital he states has 

been compliant with outpatient treatment which she reports having received 

Invega Sustenna yesterday (dose unspecified) and being followed by [facility].  Patient mentions that the report of aggressive behavior was "months 

ago" and denies having any recent episodes of agitation or aggression.  Patient 

states that he just wants to be happy and live his life without any difficulty.

  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 

own transportation and recently had come to the ER requesting assistance with 

placement as he mentions is considering living elsewhere as keep states his 

family does not understand what he goes through.  Patient denies any depressive

, manic or psychotic symptoms at this time.  Patient reports having had 

previous auditory hallucinations years ago when he was using ecstasy but denies 

any recent perceptual disturbances, no delusional material elicited during 

interview.  Patient this time denies any SI, HI, AVH or delusions.





The patient is a 37 years old  man, single, domiciled, part-time 

employed, on SSI, with extensive psychiatric history of schizophrenia, 

psychiatric admissions, no previous suicidal attempts, history of self 

injurious behavior, who was brought to the hospital due to aggressive behavior.

  Consulted to me for second opinion.  Psychiatric evaluation today the patient 

is cooperative, calm, reports feeling better, denies suicidal and homicidal 

ideation, denies visual and auditory hallucinations.  No aggressive behavior, 

no agitation has been reported.  The patient is logical, coherent and relevant.

  Oriented 3.  Compliant on medications.


Tobacco Use In Past 30 Days:  5 or More Cigarettes/Day


Alcohol Use:  Monthly or Less


Hospital Course


Patient is a 36 y/o  man, single, no children, lives alone, on SSI, 

has part time employ, psychiatric history of schizophrenia, multiple 

psychiatric admissions, no prior suicide attempt or self-injurious behavior, 

history of state hospitalizations twice, remote history of substance use, who 

was brought in under ex parte which reported patient with recent aggressive 

behavior toward father as well as displaying aggressive behavior, e.g. pushing 

refrigerator out of second story window, was brought in for evaluation and 

admitted to the inpatient psychiatry for further evaluation and management. 

Patient was started on valproic acid 500mg PO BID and found to be in 

therapeutic level, patient has already been on Invega Sustenna 156mg IM montly.

  Patient  noted good behavioral control, pleasant and cooperative with staff. 

  Patient was monitored for mood and behavior and was not noted to have had any 

episodes of agitation, irritability nor endorsing any SI or HI.  During 

admission patient continued to deny any suicidal or homicidal ideations and 

mood continued to remain stable.  Patient continue with treatment and was 

cooperative with staff as well as maintained personal hygiene.  Patient was 

adherent to medication regimen and recommendations as per primary medical team.

  Upon discharge patient stated feeling good, was calm and cooperative with 

staff.  Supportive psychotherapy provided and counseled on importance of 

adherence to treatment.  Patient agreed to continue treatment and follow up 

appointments for continuity of care. Patient will be discharged back to his 

residence.   Patient advised to call 911 or go nearest ED in case of emergency.

  Patient agreed with plan.





Results


Blood Pressure


95 / 53





Vital Signs








  Date Time  Temp Pulse Resp B/P (MAP) Pulse Ox O2 Delivery O2 Flow Rate FiO2


 


4/10/18 06:00 98.0 58 18 95/53 (67) 58   


 


4/7/18 05:00      Room Air  











Laboratory Tests








Test


  4/8/18


08:09 4/10/18


07:26


 


Random Glucose


  110 MG/DL


() 


 


 


Estimat Glomerular Filtration


Rate 81 ML/MIN


(>89) 


 








 Laboratory Results








Test


  4/8/18


08:09 4/10/18


07:26


 


Cholesterol Level


  132 MG/DL


(120-200) 


 


 


HDL Cholesterol


  44.5 MG/DL


(40.0-60.0) 


 


 


Hemoglobin A1c


  4.7 %


(4.3-6.0) 


 


 


LDL Cholesterol


  76 MG/DL


(0-99) 


 


 


Triglycerides Level


  60 MG/DL


() 


 


 


Valproic Acid (Depakene) Level


  


  71 MCG/ML


()








Summary of Procedures


none


Pending results at discharge:  No





Medications


# of Antipsychotic meds at D/C:  1


Approp Antipsych med options


1 - Minimum of three failed multiple trials of monotherapy.


2 - Documented plan to taper to monotherapy due to previous use of multiple 

meds OR cross-taper in progress at D/C.


3 - Documentation of augmentation of Clozapine.


4 - Justification other than those listed in allowable values 1-3, document here

:





Discharge


Discharge Date:  Apr 10, 2018


Discharge Diagnosis:  


(1) Schizophrenia


ICD Code:  F20.9 - Schizophrenia


Status:  Acute


Pt Condition on Discharge:  Stable


Discharge Disposition:  Discharge Home





Discharge Instructions


Diet Instructions:  As Tolerated, No Restrictions


Activities you can perform:  Regular-No Restrictions


Scheduled Appointment:  [facility]





Discharge Time


> 30 minutes





Mental Status Examination


Appearance:  Appropriate


Consciousness:  Alert


Orientation:  Person, Place, Date/Time


Motor Activity:  Normal gait


Speech:  Unremarkable


Language:  Adequate


Fund of Knowledge:  Inadequate


Attention and Concentration:  Adequate


Memory:  Unremarkable


Mood:  Appropriate


Affect:  Appropriate


Thought Process & Associations:  Intact, Logical, Goal directed, Linear


Thought Content:  Appropriate


Hallucination Type:  None


Delusion Type:  None


Suicidal Ideation:  No


Suicidal Plan:  No


Suicidal Intention:  No


Homicidal Ideation:  No


Homicidal Plan:  No


Homicidal Intention:  No


Insight:  Adequate


Judgment:  Adequate





Discharge/Advance Care Plan


Health Problems:  


(1) Schizophrenia


Goals to promote your health


* To prevent worsening of your condition and complications


* To maintain your health at the optimal level


Directions to meet your goals


*** Take your medications as prescribed


***  Follow your dietary instruction


***  Follow activity as directed





***  Keep your appointments as scheduled


***  Take your immunizations and boosters as scheduled


***  If your symptoms worsen call your PCP, if no PCP go to Urgent Care Center 

or Emergency Room ***


***  For 24/7 questions related to your inpatient stay or results of tests 

pending at discharge, please contact Dr. Alex Hussein at [phone]


***  Smoking is Dangerous to Your Health. Avoid second hand smoking ***











Alex Hussein MD Apr 10, 2018 10:53

## 2018-04-13 NOTE — PD.TTN
Patient Problems


1. Discharge planning


2. Medication compliance


3. Knowledge deficit


4. Lack of coping skills





Progress Toward Goals


Provider Present:  Dr. KAMILA Hussein


Provider Input:  


4/9 patient is improving and doing much better, he is on injection


Psychiatric Counselors Present:  Wanda Borja LCSW


Psych Therapist Input:  


4/9 patient is able to live alone, needs to be compliant with follow up -


had case management with [facility] in past, can have it again, family is very


dysfunctional


Group Spec/RT/OT/RODRIGUEZ Present:  Gely Henriquez, [employer]


Group Spec/RT/OT/RODRIGUEZ Input:  


4/9 attends some groups











Wanda Boraj LCSW Apr 13, 2018 15:53